# Patient Record
Sex: MALE | Race: WHITE
[De-identification: names, ages, dates, MRNs, and addresses within clinical notes are randomized per-mention and may not be internally consistent; named-entity substitution may affect disease eponyms.]

---

## 2018-05-29 ENCOUNTER — HOSPITAL ENCOUNTER (EMERGENCY)
Dept: HOSPITAL 56 - MW.ED | Age: 28
Discharge: HOME | End: 2018-05-29
Payer: SELF-PAY

## 2018-05-29 DIAGNOSIS — S03.00XA: Primary | ICD-10-CM

## 2018-05-29 DIAGNOSIS — Z88.0: ICD-10-CM
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
## 2018-05-29 LAB
CHLORIDE SERPL-SCNC: 104 MMOL/L (ref 98–107)
SODIUM SERPL-SCNC: 141 MMOL/L (ref 136–148)

## 2018-05-29 PROCEDURE — 80053 COMPREHEN METABOLIC PANEL: CPT

## 2018-05-29 PROCEDURE — 85025 COMPLETE CBC W/AUTO DIFF WBC: CPT

## 2018-05-29 PROCEDURE — 96361 HYDRATE IV INFUSION ADD-ON: CPT

## 2018-05-29 PROCEDURE — 36415 COLL VENOUS BLD VENIPUNCTURE: CPT

## 2018-05-29 PROCEDURE — 96374 THER/PROPH/DIAG INJ IV PUSH: CPT

## 2018-05-29 PROCEDURE — 99284 EMERGENCY DEPT VISIT MOD MDM: CPT

## 2018-05-29 PROCEDURE — 96375 TX/PRO/DX INJ NEW DRUG ADDON: CPT

## 2018-05-29 PROCEDURE — 21480 CLTX TMPRMAND DISLC 1ST/SBSQ: CPT

## 2018-05-29 PROCEDURE — 70100 X-RAY EXAM OF JAW <4VIEWS: CPT

## 2018-05-29 NOTE — EDM.PDOC
<Archana Delvalle - Last Filed: 05/29/18 19:46>





ED HPI GENERAL MEDICAL PROBLEM





- General


Chief Complaint: ENT Problem


Stated Complaint: LOCKED JAW


Time Seen by Provider: 05/29/18 18:07





- History of Present Illness


INITIAL COMMENTS - FREE TEXT/NARRATIVE: 





HISTORY AND PHYSICAL:





History of present illness:


The patient is a 27-year-old male who presents with complaints that his jaw is 

locked open and he cannot close it and this is not the first time this has 

occurred. He actually tells me that this has happened about 8 or 9 times. The 

patient says this is happened several times before and he was supposed to follow

-up at [facility] for definitive treatment and was not able to follow-up. In 

the past he has required conscious sedation to do this and it happens once 

every 6 months or so. He says it usually occurs when he vomits coughs laughs  

or yawns too hard. 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 

from work at an oil rig. He last ate sometime this morning around 6 AM. He has 

no chest pain shortness of breath or abdominal pain and had an episode of 

vomiting earlier today which triggered this. He has had no direct trauma to the 

area. Patient states that he only vomited one time and that was triggered by 

the heat and he otherwise feels fine currently. He has no nausea or abdominal 

complaints currently. He has no headache or dizziness.





Review of systems: 


As per history of present illness and below otherwise all systems reviewed and 

negative.





Past medical history: 


As per history of present illness and as reviewed below otherwise 

noncontributory.





Surgical history: 


As per history of present illness and as reviewed below otherwise 

noncontributory.





Social history: 


No reported history of drug or alcohol abuse.





Family history: 


As per history of present illness and as reviewed below otherwise 

noncontributory.





Physical exam:


General: Well-developed well-nourished man who is nontoxic and vital signs are 

noted by me. He visibly has his mouth open and cannot close it. There is no 

soft tissue swelling of the face


HEENT: Atraumatic, normocephalic, pupils reactive, negative for conjunctival 

pallor or scleral icterus, mucous membranes moist, throat clear, neck supple, 

nontender, trachea midline. No cervical adenopathy or nuchal rigidity there are 

no palpable tenderness or deformities of the mandible or the maxilla.


Lungs: Clear to auscultation, breath sounds equal bilaterally, chest nontender.


Heart: S1S2, regular rate and rhythm no overt murmurs


Abdomen: Soft, nondistended, nontender. NABS


Pelvis: Deferred


Genitourinary: Deferred.


Rectal: Deferred.


Extremities: Atraumatic, negative for cords or calf pain. Neurovascular 

unremarkable.


Neuro: Awake, alert, oriented. Cranial nerves II through XII unremarkable. 

Cerebellum unremarkable. Motor and sensory unremarkable throughout. Exam 

nonfocal.





Diagnostics:


CBC CMP x-ray of the mandible





Therapeutics:


IV, IV fluids, Dilaudid, Zofran, Toradol Versed





Procedure note: Dilaudid and Zofran and Toradol had been given earlier and 

Versed was administered in a delayed fashion and I offered the patient to use 

the syringe method to try to reduce the mandible. He told me that he could not 

bite down on the syringe and exhibited poor effort. I gently massaged the 

musculature of the jaw more on his left side due to x-ray findings and 

attempted to slowly distract the mandible downward and the patient would not 

relax and tried to clench. Despite clinically looking more relaxed still he is 

still resistant to my attempts. I made 2 attempts to using this method and Dr. Draper is also made an attempt and again the patient would not relax and says 

it is too painful. We have contacted anesthesia to come to the ER do conscious 

sedation.





1945: The patient is aware that anesthesia will come and do conscious sedation 

and I will endorse this case to Dr. Draper to perform the procedure and 

repeat the x-ray.





Impression: 


Mandibular dislocation recurrent





Definitive disposition and diagnosis as appropriate pending reevaluation and 

review of above.


  ** Oral/Mouth


Pain Score (Numeric/FACES): 8





- Related Data


 Allergies











Allergy/AdvReac Type Severity Reaction Status Date / Time


 


Penicillins Allergy  Hives Verified 05/29/18 18:53











Home Meds: 


 Home Meds





. [No Known Home Meds]  05/29/18 [History]











ED ROS GENERAL





- Review of Systems


Review Of Systems: ROS reveals no pertinent complaints other than HPI.





ED EXAM, GENERAL





- Physical Exam


Exam: See Below (See dictation)





Course





- Vital Signs


Last Recorded V/S: 


 Last Vital Signs











Temp  97.2 F   05/29/18 17:58


 


Pulse  77   05/29/18 17:58


 


Resp  20   05/29/18 17:58


 


BP  134/79   05/29/18 17:58


 


Pulse Ox  98   05/29/18 17:58














- Orders/Labs/Meds


Orders: 


 Active Orders 24 hr











 Category Date Time Status


 


 Cardiac Monitoring [RC] .AS DIRECTED Care  05/29/18 19:10 Active


 


 Oxygen Therapy, ED [RC] ASDIRECTED Care  05/29/18 19:10 Active


 


 Pulse Oximetry [RC] ASDIRECTED Care  05/29/18 19:10 Active


 


 Mandible Less 4V [CR] Stat Exams  05/29/18 18:12 Taken


 


 Mandible Less 4V [CR] Stat Exams  05/29/18 20:23 Taken


 


 Sodium Chloride 0.9% [Normal Saline] 1,000 ml Med  05/29/18 19:45 Active





 IV ASDIRECTED   


 


 Sodium Chloride 0.9% [Saline Flush] Med  05/29/18 18:11 Active





 10 ml FLUSH ASDIRECTED PRN   


 


 Sodium Chloride 0.9% [Saline Flush] Med  05/29/18 18:11 Active





 2.5 ml FLUSH ASDIRECTED PRN   


 


 Saline Lock Insert [OM.PC] Stat Oth  05/29/18 18:11 Ordered








 Medication Orders





Sodium Chloride (Normal Saline)  1,000 mls @ 150 mls/hr IV ASDIRECTED WALESKA


   Last Admin: 05/29/18 19:46  Dose: 150 mls/hr


Sodium Chloride (Saline Flush)  10 ml FLUSH ASDIRECTED PRN


   PRN Reason: Keep Vein Open


   Last Admin: 05/29/18 19:46  Dose: 10 ml


Sodium Chloride (Saline Flush)  2.5 ml FLUSH ASDIRECTED PRN


   PRN Reason: Keep Vein Open


   Last Admin: 05/29/18 19:46  Dose: 2.5 ml








Labs: 


 Laboratory Tests











  05/29/18 05/29/18 Range/Units





  18:15 18:15 


 


WBC  9.70   (4.0-11.0)  K/uL


 


RBC  5.23   (4.50-5.90)  M/uL


 


Hgb  15.6   (13.0-17.0)  g/dL


 


Hct  42.5   (38.0-50.0)  %


 


MCV  81.3   (80.0-98.0)  fL


 


MCH  29.8   (27.0-32.0)  pg


 


MCHC  36.7   (31.0-37.0)  g/dL


 


RDW Std Deviation  36.3   (28.0-62.0)  fl


 


RDW Coeff of Ifeanyi  12   (11.0-15.0)  %


 


Plt Count  282   (150-400)  K/uL


 


MPV  9.00   (7.40-12.00)  fL


 


Neut % (Auto)  53.0   (48.0-80.0)  %


 


Lymph % (Auto)  33.3   (16.0-40.0)  %


 


Mono % (Auto)  10.9   (0.0-15.0)  %


 


Eos % (Auto)  2.5   (0.0-7.0)  %


 


Baso % (Auto)  0.3   (0.0-1.5)  %


 


Neut # (Auto)  5.1   (1.4-5.7)  K/uL


 


Lymph # (Auto)  3.2 H   (0.6-2.4)  K/uL


 


Mono # (Auto)  1.1 H   (0.0-0.8)  K/uL


 


Eos # (Auto)  0.2   (0.0-0.7)  K/uL


 


Baso # (Auto)  0.0   (0.0-0.1)  K/uL


 


Nucleated RBC %  0.0   /100WBC


 


Nucleated RBCs #  0   K/uL


 


Sodium   141  (136-148)  mmol/L


 


Potassium   3.1 L  (3.5-5.1)  mmol/L


 


Chloride   104  ()  mmol/L


 


Carbon Dioxide   27.2  (21.0-32.0)  mmol/L


 


BUN   31 H  (7.0-18.0)  mg/dL


 


Creatinine   1.1  (0.8-1.3)  mg/dL


 


Est Cr Clr Drug Dosing   96.73  mL/min


 


Estimated GFR (MDRD)   > 60.0  ml/min


 


Glucose   85  ()  mg/dL


 


Calcium   9.5  (8.5-10.1)  mg/dL


 


Total Bilirubin   0.9  (0.2-1.0)  mg/dL


 


AST   28  (15-37)  IU/L


 


ALT   39  (14-63)  IU/L


 


Alkaline Phosphatase   53  ()  U/L


 


Total Protein   7.9  (6.4-8.2)  g/dL


 


Albumin   4.9  (3.4-5.0)  g/dL


 


Globulin   3.0  (2.0-3.5)  g/dL


 


Albumin/Globulin Ratio   1.6  (1.3-2.8)  











Meds: 


Medications











Generic Name Dose Route Start Last Admin





  Trade Name Esvinq  PRN Reason Stop Dose Admin


 


Sodium Chloride  1,000 mls @ 150 mls/hr  05/29/18 19:45  05/29/18 19:46





  Normal Saline  IV   150 mls/hr





  ASDIRECTED WALESKA   Administration





     





     





     





     


 


Sodium Chloride  10 ml  05/29/18 18:11  05/29/18 19:46





  Saline Flush  FLUSH   10 ml





  ASDIRECTED PRN   Administration





  Keep Vein Open   





     





     





     


 


Sodium Chloride  2.5 ml  05/29/18 18:11  05/29/18 19:46





  Saline Flush  FLUSH   2.5 ml





  ASDIRECTED PRN   Administration





  Keep Vein Open   





     





     





     














Discontinued Medications














Generic Name Dose Route Start Last Admin





  Trade Name John  PRN Reason Stop Dose Admin


 


Fentanyl  Confirm  05/29/18 20:09  





  Sublimaze  Administered  05/29/18 20:10  





  Dose   





  100 mcg   





  .ROUTE   





  .STK-MED ONE   





     





     





     





     


 


Hydromorphone HCl  1 mg  05/29/18 18:11  05/29/18 18:28





  Dilaudid  IVPUSH  05/29/18 18:12  1 mg





  ONETIME ONE   Administration





     





     





     





     


 


Sodium Chloride  1,000 mls @ 999 mls/hr  05/29/18 18:11  05/29/18 18:29





  Normal Saline  IV  05/29/18 19:11  999 mls/hr





  STAT ONE   Administration





     





     





     





     


 


Ketorolac Tromethamine  30 mg  05/29/18 18:11  05/29/18 18:27





  Toradol  IVPUSH  05/29/18 18:12  30 mg





  ONETIME ONE   Administration





     





     





     





     


 


Midazolam HCl  2 mg  05/29/18 19:07  05/29/18 19:14





  Versed 1 Mg/Ml  IVPUSH  05/29/18 19:08  2 mg





  ONETIME ONE   Administration





     





     





     





     


 


Morphine Sulfate  2 mg  05/29/18 19:52  05/29/18 20:03





  Morphine  IVPUSH  05/29/18 19:53  Not Given





  ONETIME ONE   





     





     





     





     


 


Ondansetron HCl  4 mg  05/29/18 18:17  05/29/18 18:27





  Zofran  IVPUSH  05/29/18 18:18  4 mg





  ONETIME ONE   Administration





     





     





     





     


 


Propofol  Confirm  05/29/18 20:07  





  Diprivan  20 Ml  Administered  05/29/18 20:08  





  Dose   





  200 mg   





  .ROUTE   





  .STK-MED ONE   





     





     





     





     














Departure





- Departure


Disposition: Home, Self-Care 01


Condition: Good


Clinical Impression: 


Dislocation of mandible


Qualifiers:


 Encounter type: initial encounter Qualified Code(s): S03.00XA - Dislocation of 

jaw, unspecified side, initial encounter








- Discharge Information


Referrals: 


PCP,None [Primary Care Provider] - 


Forms:  ED Department Discharge


Additional Instructions: 


The following information is given to patients seen in the emergency department 

who are being discharged to home. This information is to outline your options 

for follow-up care. We provide all patients seen in our emergency department 

with a follow-up referral.





The need for follow-up, as well as the timing and circumstances, are variable 

depending upon the specifics of your emergency department visit.





If you don't have a primary care physician on staff, we will provide you with a 

referral. We always advise you to contact your personal physician following an 

emergency department visit to inform them of the circumstance of the visit and 

for follow-up with them and/or the need for any referrals to a consulting 

specialist.





The emergency department will also refer you to a specialist when appropriate. 

This referral assures that you have the opportunity for followup care with a 

specialist. All of these measure are taken in an effort to provide you with 

optimal care, which includes your followup.





Under all circumstances we always encourage you to contact your private 

physician who remains a resource for coordinating  your care. When calling for 

followup care, please make the office aware that this follow-up is from your 

recent emergency room visit. If for any reason you are refused follow-up, 

please contact the [facility] emergency 

department at [phone] and ask to speak to the emergency department 

charge nurse.





[facility] 


Primary care- [place] [address]


[phone]








Only eat soft foods with small bites and push hydration over the next 48-72 

hours. Apply ice to face for any swelling. Please contact your provider or one 

of our providers for follow-up care and referral for definitive treatment. You 

may also contact the oral maxillofacial surgeon in [facility] in [place] or 

in [place] at [facility] to get further evaluation and care. Return to ER as 

needed and as discussed.





- My Orders


Last 24 Hours: 


My Active Orders





05/29/18 20:23


Mandible Less 4V [CR] Stat 














- Assessment/Plan


Last 24 Hours: 


My Active Orders





05/29/18 20:23


Mandible Less 4V [CR] Stat 














<Rohan Draper - Last Filed: 05/29/18 21:27>





ED HPI GENERAL MEDICAL PROBLEM





- History of Present Illness


INITIAL COMMENTS - FREE TEXT/NARRATIVE: 


This is Dr. Draper taking over care for patient at 07 30 from Dr. Delvalle. 

Dr. Delvalle briefed me on patient's condition as well as pertinent labs and 

history. Dr. Cadet, anesthesiologist, was able to sedate the patient with 

propofol and fentanyl and I was able to successfully reduce mandible 

dislocation. Using continuous downward and posterior pressure. There were no 

complications to procedure. Patient tolerated procedure well. Repeat x-rays of 

mandible confirmed relocation. Patient was discharged in good condition with 

instructions to see his Maxofascial surgeon to correct his mandible so no 

future episodes occur. Patient was also instructed to follow-up with his 

primary care physician and to return to emergency department if he had any new 

or worsening symptoms.








ED ROS GENERAL





- Review of Systems


Review Of Systems: See Below





ED EXAM, GENERAL





- Physical Exam


Exam: See Below





Departure





- Departure


Time of Disposition: 21:27

## 2018-05-29 NOTE — PCM.SN
- Free Text/Narrative


Note: 





Patient seen at the request of Dr Delvalle. She had attempted to reduce a left 

mandibular dislocation earlier. Due to pain demonstrated by patient the 

sedation meds were inadequate. I reviewed his history which included multiple 

prior dislocations in past with subsequesnt recommendations to seek surgical 

intervention. He has not done this. He states that he has not had oral intake 

today. He is allergic to PCN. 





With patient in the sitting position, I used total dose of 100 mg propofol, and 

[address] Draper immediately reduced the dislocation and i 

attended the patient in the next 15 minutes and he became animated and was 

released to x-ray for confirmation. Time of bedside attendance was 1955 to 

2035. He was released back to the [facility] care.

## 2018-05-30 NOTE — CR
EXAM DATE: 18



PATIENT'S AGE: 27





Patient: VIOLETA MORRISON



Facility: [place], [place]

Patient ID: [id number]

Site Patient ID: [id number].

Site Accession #: [id number]

: 1990

Study: XRay Head mandible [id number]/29/2018 8:58:03 PM

Ordering Physician: Baron Madrigal



Final Report: 

HISTORY:

Mandible relocation.



FINDINGS:

Two oblique and AP image of the mandible were obtained and compared to exam 
from 1834 hours. There is normal alignment of both temporomandibular joints. No 
mandibular fracture is identified.



IMPRESSION:

Normal alignment of both TMJs.



Dictated by Abida Vergara MD @ 2018 9:25:53 PM







Dictated by: Abida Vergara MD @ 2018 21:26:03

(Electronic Signature)



Report Signed by Proxy.
[facility]

## 2018-05-30 NOTE — CR
EXAM DATE: 18



PATIENT'S AGE: 27





Patient: VIOLETA MORRISON



Facility: [place], [place]

Patient ID: [id number]

Site Patient ID: [id number].

Site Accession #: [id number]

: 1990

Study: XRay Head Bilateral mandible [id number]/29/2018 6:49:07 PM

Ordering Physician: Adelia Magaña



Final Report: 

HISTORY:

Bilateral mandibular dislocation. History of dislocation.



FINDINGS:

AP and oblique views of the TMJs were obtained. The left mandibular condyle 
appears anterior to the temporal fossa. The right mandibular condyle is also 
mildly displaced anteriorly but not as significantly at the left. The mandible 
is intact.



IMPRESSION:

Plain films suggest anterior dislocation of the left mandibular condyle 
relative to the fossa with milder anterior subluxation on the right. Recommend 
CT for complete evaluation.



Dictated by Abida Vergara MD @ 2018 7:20:37 PM







Dictated by: Abida Vergara MD @ 2018 19:20:46

(Electronic Signature)



Report Signed by Proxy.
JAMILA

## 2018-06-26 ENCOUNTER — HOSPITAL ENCOUNTER (EMERGENCY)
Dept: HOSPITAL 56 - MW.ED | Age: 28
Discharge: HOME | End: 2018-06-26
Payer: SELF-PAY

## 2018-06-26 DIAGNOSIS — S03.03XA: Primary | ICD-10-CM
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
## 2018-06-26 DIAGNOSIS — F17.210: ICD-10-CM

## 2018-06-26 PROCEDURE — 99153 MOD SED SAME PHYS/QHP EA: CPT

## 2018-06-26 PROCEDURE — 96360 HYDRATION IV INFUSION INIT: CPT

## 2018-06-26 PROCEDURE — 70110 X-RAY EXAM OF JAW 4/> VIEWS: CPT

## 2018-06-26 PROCEDURE — 99283 EMERGENCY DEPT VISIT LOW MDM: CPT

## 2018-06-26 PROCEDURE — 99152 MOD SED SAME PHYS/QHP 5/>YRS: CPT

## 2018-06-26 PROCEDURE — 21480 CLTX TMPRMAND DISLC 1ST/SBSQ: CPT

## 2018-06-26 NOTE — EDM.PDOC
ED HPI GENERAL MEDICAL PROBLEM





- General


Chief Complaint: ENT Problem


Stated Complaint: LOCK JAW


Time Seen by Provider: 06/26/18 13:11


Source of Information: Reports: Patient


History Limitations: Reports: No Limitations





- History of Present Illness


INITIAL COMMENTS - FREE TEXT/NARRATIVE: 


History of present illness:


[]Patient was transferred from [facility] for conscious sedation for 

reduction of jaw dislocation that have been this morning. 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 

episode of his jaw dislocating. Not been able to follow-up with maxillofacial 

surgeon due to lack of medical insurance. Patient last ate at 4 AM denies any 

other injuries





Review of systems: 


As per history of present illness and below otherwise all systems reviewed and 

negative.





Past medical history: 


As per history of present illness and as reviewed below otherwise 

noncontributory.





Surgical history: 


As per history of present illness and as reviewed below otherwise 

noncontributory.





Social history: 


No reported history of drug or alcohol abuse.





Family history: 


As per history of present illness and as reviewed below otherwise 

noncontributory.





Physical exam:


General: Well developed, well nourished in NAD


HEENT: Atraumatic, mandible opened and locked appear symmetrical, pupils 

reactive, negative for conjunctival pallor or scleral icterus, mucous membranes 

moist, throat clear, neck supple, nontender, trachea midline.


Lungs: Clear to auscultation, breath sounds equal bilaterally, chest nontender.


Heart: S1S2, regular, negative for clicks, rubs, or JVD.


Abdomen: Soft, nondistended, nontender. Negative for masses or 

hepatosplenomegaly. Negative for costovertebral tenderness.


Pelvis: Stable nontender.


Genitourinary: Deferred.


Rectal: Deferred.


Extremities: Atraumatic, negative for cords or calf pain. Neurovascular 

unremarkable.


Neuro: Awake, alert, oriented. Cranial nerves II through XII unremarkable. 

Cerebellum unremarkable. Motor and sensory unremarkable throughout. Exam 

nonfocal.





Diagnostics:


[]Mandibular x-ray shows anterior dislocation of both TMJs





Therapeutics:


[]Norflex and Toradol which was given prior to arrival, conscious sedation done 

by anesthesia reduction was done with pressure placed on the coronoid process 

on the right with anterior pressure placed on the left angle of the mandible 

resulting in reduction of the right TMJ followed by posterior pressure on the 

left coronoid process with easy reduction of the right TMJ





Impression: 


[]Bilateral anterior temporomandibular dislocation, reduction with conscious 

sedation





Plan:


[]Follow-up with primary care.





Definitive disposition and diagnosis as appropriate pending reevaluation and 

review of above.





  ** jaw


Pain Score (Numeric/FACES): 8





- Related Data


 Allergies











Allergy/AdvReac Type Severity Reaction Status Date / Time


 


Penicillins Allergy  Hives Verified 06/26/18 13:09











Home Meds: 


 Home Meds





. [No Known Home Meds]  05/29/18 [History]











Past Medical History





- Past Health History


Medical/Surgical History: Denies Medical/Surgical History


HEENT History: Reports: Other (See Below)


Other HEENT History: Lock Jaw


Cardiovascular History: Reports: None


Respiratory History: Reports: None


Gastrointestinal History: Reports: Other (See Below)


Other Gastrointestinal History: Hx hernia


Genitourinary History: Reports: None


Musculoskeletal History: Reports: Other (See Below)


Other Musculoskeletal History: recurrent lock jaw


Neurological History: Reports: None


Psychiatric History: Reports: None


Endocrine/Metabolic History: Reports: None


Hematologic History: Reports: None


Immunologic History: Reports: None


Oncologic (Cancer) History: Reports: None


Dermatologic History: Reports: None





- Infectious Disease History


Infectious Disease History: Reports: Chicken Pox





- Past Surgical History


Head Surgeries/Procedures: Reports: None


HEENT Surgical History: Reports: None


Cardiovascular Surgical History: Reports: None


Respiratory Surgical History: Reports: None


GI Surgical History: Reports: None


Male  Surgical History: Reports: None


Endocrine Surgical History: Reports: None


Neurological Surgical History: Reports: None


Musculoskeletal Surgical History: Reports: None


Oncologic Surgical History: Reports: None


Dermatological Surgical History: Reports: None





Social & Family History





- Family History


Family Medical History: Noncontributory





- Tobacco Use


Smoking Status *Q: Current Every Day Smoker


Years of Tobacco use: 10


Packs/Tins Daily: 1





- Caffeine Use


Caffeine Use: Reports: None





- Recreational Drug Use


Recreational Drug Use: No





ED ROS ENT





- Review of Systems


Review Of Systems: See Below (The history of present illness)





ED EXAM, ENT





- Physical Exam


Exam: See Below (See history of present illness)





Course





- Vital Signs


Last Recorded V/S: 


 Last Vital Signs











Temp  96.8 F   06/26/18 13:10


 


Pulse  73   06/26/18 13:10


 


Resp  18   06/26/18 13:10


 


BP  130/84   06/26/18 13:10


 


Pulse Ox  98   06/26/18 13:10














- Orders/Labs/Meds


Orders: 


 Active Orders 24 hr











 Category Date Time Status


 


 Mandible Comp Min 4V [CR] Stat Exams  06/26/18 15:01 Ordered


 


 Orphenadrine [Norflex] Med  06/26/18 14:00 Active





 60 mg IM Q12H   


 


 Sodium Chloride 0.9% [Saline Flush] Med  06/26/18 13:17 Active





 10 ml FLUSH ASDIRECTED PRN   


 


 Sodium Chloride 0.9% [Saline Flush] Med  06/26/18 14:25 Active





 10 ml FLUSH ASDIRECTED PRN   


 


 Sodium Chloride 0.9% [Saline Flush] Med  06/26/18 13:17 Active





 2.5 ml FLUSH ASDIRECTED PRN   


 


 Sodium Chloride 0.9% [Saline Flush] Med  06/26/18 14:25 Active





 2.5 ml FLUSH ASDIRECTED PRN   


 


 Saline Lock Insert [OM.PC] Stat Oth  06/26/18 13:17 Ordered


 


 Saline Lock Insert [OM.PC] Stat Oth  06/26/18 14:25 Ordered








 Medication Orders





Orphenadrine Citrate (Norflex)  60 mg IM Q12H WALESKA


   Last Admin: 06/26/18 14:04  Dose: 60 mg


Sodium Chloride (Saline Flush)  10 ml FLUSH ASDIRECTED PRN


   PRN Reason: Keep Vein Open


Sodium Chloride (Saline Flush)  2.5 ml FLUSH ASDIRECTED PRN


   PRN Reason: Keep Vein Open


Sodium Chloride (Saline Flush)  10 ml FLUSH ASDIRECTED PRN


   PRN Reason: Keep Vein Open


Sodium Chloride (Saline Flush)  2.5 ml FLUSH ASDIRECTED PRN


   PRN Reason: Keep Vein Open








Meds: 


Medications











Generic Name Dose Route Start Last Admin





  Trade Name Freq  PRN Reason Stop Dose Admin


 


Orphenadrine Citrate  60 mg  06/26/18 14:00  06/26/18 14:04





  Norflex  IM   60 mg





  Q12H WALESKA   Administration





     





     





     





     


 


Sodium Chloride  10 ml  06/26/18 13:17  





  Saline Flush  FLUSH   





  ASDIRECTED PRN   





  Keep Vein Open   





     





     





     


 


Sodium Chloride  2.5 ml  06/26/18 13:17  





  Saline Flush  FLUSH   





  ASDIRECTED PRN   





  Keep Vein Open   





     





     





     


 


Sodium Chloride  10 ml  06/26/18 14:25  





  Saline Flush  FLUSH   





  ASDIRECTED PRN   





  Keep Vein Open   





     





     





     


 


Sodium Chloride  2.5 ml  06/26/18 14:25  





  Saline Flush  FLUSH   





  ASDIRECTED PRN   





  Keep Vein Open   





     





     





     














Discontinued Medications














Generic Name Dose Route Start Last Admin





  Trade Name John  PRN Reason Stop Dose Admin


 


Fentanyl  Confirm  06/26/18 15:01  





  Sublimaze  Administered  06/26/18 15:02  





  Dose   





  100 mcg   





  .ROUTE   





  .STK-MED ONE   





     





     





     





     


 


Midazolam HCl  Confirm  06/26/18 15:01  





  Versed 1 Mg/Ml  Administered  06/26/18 15:02  





  Dose   





  2 mg   





  .ROUTE   





  .STK-MED ONE   





     





     





     





     


 


Propofol  Confirm  06/26/18 15:01  





  Diprivan  20 Ml  Administered  06/26/18 15:02  





  Dose   





  200 mg   





  .ROUTE   





  .STK-MED ONE   





     





     





     





     


 


Propofol  Confirm  06/26/18 15:09  





  Diprivan  20 Ml  Administered  06/26/18 15:10  





  Dose   





  200 mg   





  .ROUTE   





  .STK-MED ONE   





     





     





     





     














Departure





- Departure


Time of Disposition: 15:40


Disposition: Home, Self-Care 01


Condition: Good


Clinical Impression: 


Dislocation of both temporomandibular joints


Qualifiers:


 Encounter type: initial encounter Qualified Code(s): S03.03XA - Dislocation of 

jaw, bilateral, initial encounter








- Discharge Information


Referrals: 


PCP,None [Primary Care Provider] - 


Forms:  ED Department Discharge


Additional Instructions: 


The following information is given to patients seen in the emergency department 

who are being discharged to home. This information is to outline your options 

for follow-up care. We provide all patients seen in our emergency department 

with a follow-up referral.





The need for follow-up, as well as the timing and circumstances, are variable 

depending upon the specifics of your emergency department visit.





If you don't have a primary care physician on staff, we will provide you with a 

referral. We always advise you to contact your personal physician following an 

emergency department visit to inform them of the circumstance of the visit and 

for follow-up with them and/or the need for any referrals to a consulting 

specialist.





The emergency department will also refer you to a specialist when appropriate. 

This referral assures that you have the opportunity for follow-up care with a 

specialist. All of these measure are taken in an effort to provide you with 

optimal care, which includes your follow-up.





Under all circumstances we always encourage you to contact your private 

physician who remains a resource for coordinating your care. When calling for 

follow-up care, please make the office aware that this follow-up is from your 

recent emergency room visit. If for any reason you are refused follow-up, 

please contact the [facility] Emergency 

Department at [phone] and asked to speak to the emergency department 

charge nurse.





[facility]


Primary Care


[address] [place] [id number]


Phone: [phone]


Fax: [phone]





- My Orders


Last 24 Hours: 


My Active Orders





06/26/18 13:17


Sodium Chloride 0.9% [Saline Flush]   10 ml FLUSH ASDIRECTED PRN 


Sodium Chloride 0.9% [Saline Flush]   2.5 ml FLUSH ASDIRECTED PRN 


Saline Lock Insert [OM.PC] Stat 





06/26/18 14:00


Orphenadrine [Norflex]   60 mg IM Q12H 





06/26/18 14:25


Sodium Chloride 0.9% [Saline Flush]   10 ml FLUSH ASDIRECTED PRN 


Sodium Chloride 0.9% [Saline Flush]   2.5 ml FLUSH ASDIRECTED PRN 


Saline Lock Insert [OM.PC] Stat 





06/26/18 15:01


Mandible Comp Min 4V [CR] Stat 














- Assessment/Plan


Last 24 Hours: 


My Active Orders





06/26/18 13:17


Sodium Chloride 0.9% [Saline Flush]   10 ml FLUSH ASDIRECTED PRN 


Sodium Chloride 0.9% [Saline Flush]   2.5 ml FLUSH ASDIRECTED PRN 


Saline Lock Insert [OM.PC] Stat 





06/26/18 14:00


Orphenadrine [Norflex]   60 mg IM Q12H 





06/26/18 14:25


Sodium Chloride 0.9% [Saline Flush]   10 ml FLUSH ASDIRECTED PRN 


Sodium Chloride 0.9% [Saline Flush]   2.5 ml FLUSH ASDIRECTED PRN 


Saline Lock Insert [OM.PC] Stat 





06/26/18 15:01


Mandible Comp Min 4V [CR] Stat

## 2018-06-26 NOTE — CR
EXAMINATION: Mandible

 

HISTORY: Dislocation

 

COMPARISON: 5/29/2018

 

TECHNIQUE: PA, Syed, and bilateral oblique views.

 

FINDINGS/IMPRESSION: The mandible appears intact and bone mineralization is normal. Both mandibular c
ondyles appear anteriorly located relative to the articular tubercle suggesting dislocation bilateral
ly.

## 2018-06-26 NOTE — PCM.PREANE
Preanesthetic Assessment





- Procedure


Proposed Procedure: 





sedation for jaw dislocation





- Anesthesia/Transfusion/Family Hx


Anesthesia History: Prior Anesthesia Without Reaction


Family History of Anesthesia Reaction: No





- Review of Systems


Other: Reports: None





- Physical Assessment


NPO Status Date: 06/26/18


NPO Status Time: 04:00


O2 Sat by Pulse Oximetry: 98


Respiratory Rate: 18


Vital Signs: 





 Last Vital Signs











Temp  36.0 C   06/26/18 13:10


 


Pulse  73   06/26/18 13:10


 


Resp  18   06/26/18 13:10


 


BP  130/84   06/26/18 13:10


 


Pulse Ox  98   06/26/18 13:10











Height: 5 ft 8 in


Weight: 83.915 kg


ASA Class: 2E


Mental Status: Alert & Oriented x3


Airway Class: Mallampati = 2


Dentition: Reports: Normal Dentition


Mouth Opening Finger Breadths: 1 (jaw dislocated)


ROM/Head Extension: Full





- Allergies


Allergies/Adverse Reactions: 


 Allergies











Allergy/AdvReac Type Severity Reaction Status Date / Time


 


Penicillins Allergy  Hives Verified 06/26/18 13:09














- Blood


Blood Available: No





- Acknowledgements


Anesthesia Type Planned: MAC


Pt an Appropriate Candidate for the Planned Anesthesia: Yes


Alternatives and Risks of Anesthesia Discussed w Pt/Guardian: Yes


Pt/Guardian Understands and Agrees with Anesthesia Plan: Yes





PreAnesthesia Questionnaire





- Past Health History


Medical/Surgical History: Denies Medical/Surgical History


HEENT History: Reports: Other (See Below)


Other HEENT History: Lock Jaw


Cardiovascular History: Reports: None


Respiratory History: Reports: None


Gastrointestinal History: Reports: Other (See Below)


Other Gastrointestinal History: Hx hernia


Genitourinary History: Reports: None


Musculoskeletal History: Reports: Other (See Below)


Other Musculoskeletal History: recurrent lock jaw


Neurological History: Reports: None


Psychiatric History: Reports: None


Endocrine/Metabolic History: Reports: None


Hematologic History: Reports: None


Immunologic History: Reports: None


Oncologic (Cancer) History: Reports: None


Dermatologic History: Reports: None





- Infectious Disease History


Infectious Disease History: Reports: Chicken Pox





- Past Surgical History


Head Surgeries/Procedures: Reports: None


HEENT Surgical History: Reports: None


Cardiovascular Surgical History: Reports: None


Respiratory Surgical History: Reports: None


GI Surgical History: Reports: None


Male  Surgical History: Reports: None


Endocrine Surgical History: Reports: None


Neurological Surgical History: Reports: None


Musculoskeletal Surgical History: Reports: None


Oncologic Surgical History: Reports: None


Dermatological Surgical History: Reports: None





- SUBSTANCE USE


Smoking Status *Q: Current Every Day Smoker


Recreational Drug Use History: No





- HOME MEDS


Home Medications: 


 Home Meds





. [No Known Home Meds]  05/29/18 [History]











- CURRENT (IN HOUSE) MEDS


Current Meds: 





 Current Medications





Orphenadrine Citrate (Norflex)  60 mg IM Q12H WALESKA


   Last Admin: 06/26/18 14:04 Dose:  60 mg


Sodium Chloride (Saline Flush)  10 ml FLUSH ASDIRECTED PRN


   PRN Reason: Keep Vein Open


Sodium Chloride (Saline Flush)  2.5 ml FLUSH ASDIRECTED PRN


   PRN Reason: Keep Vein Open


Sodium Chloride (Saline Flush)  10 ml FLUSH ASDIRECTED PRN


   PRN Reason: Keep Vein Open


Sodium Chloride (Saline Flush)  2.5 ml FLUSH ASDIRECTED PRN


   PRN Reason: Keep Vein Open





Discontinued Medications





Fentanyl (Sublimaze) Confirm Administered Dose 100 mcg .ROUTE .STK-MED ONE


   Stop: 06/26/18 15:02


Midazolam HCl (Versed 1 Mg/Ml) Confirm Administered Dose 2 mg .ROUTE .STK-MED 

ONE


   Stop: 06/26/18 15:02


Propofol (Diprivan  20 Ml) Confirm Administered Dose 200 mg .ROUTE .STK-MED ONE


   Stop: 06/26/18 15:02


Propofol (Diprivan  20 Ml) Confirm Administered Dose 200 mg .ROUTE .STK-MED ONE


   Stop: 06/26/18 15:10

## 2018-06-26 NOTE — CR
EXAMINATION: Mandibles

 

HISTORY: Reduction

 

COMPARISON: Same day

 

TECHNIQUE: 4 views

 

FINDINGS/IMPRESSION: The mandibular condyles are located posterior to the articular tubercle suggesti
ng a successful reduction. Otherwise osseous structures appear intact.

## 2018-08-10 ENCOUNTER — HOSPITAL ENCOUNTER (EMERGENCY)
Dept: HOSPITAL 56 - MW.ED | Age: 28
Discharge: HOME | End: 2018-08-10
Payer: COMMERCIAL

## 2018-08-10 DIAGNOSIS — Z88.4: ICD-10-CM
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
## 2018-08-10 DIAGNOSIS — Z88.5: ICD-10-CM

## 2018-08-10 DIAGNOSIS — S03.00XA: Primary | ICD-10-CM

## 2018-08-10 PROCEDURE — 21480 CLTX TMPRMAND DISLC 1ST/SBSQ: CPT

## 2018-08-10 PROCEDURE — 96374 THER/PROPH/DIAG INJ IV PUSH: CPT

## 2018-08-10 PROCEDURE — 70110 X-RAY EXAM OF JAW 4/> VIEWS: CPT

## 2018-08-10 PROCEDURE — 99284 EMERGENCY DEPT VISIT MOD MDM: CPT

## 2018-08-10 PROCEDURE — 99153 MOD SED SAME PHYS/QHP EA: CPT

## 2018-08-10 NOTE — PCM.PREANE
Preanesthetic Assessment





- Anesthesia/Transfusion/Family Hx


Anesthesia History: Prior Anesthesia Without Reaction


Family History of Anesthesia Reaction: No


Transfusion History: No Prior Transfusion(s)


Intubation History: Unknown





- Review of Systems


General: No Symptoms


Pulmonary: No Symptoms


Cardiovascular: No Symptoms


Gastrointestinal: No Symptoms


Neurological: No Symptoms


Other: Reports: None





- Physical Assessment


NPO Status Date: 08/10/18


NPO Status Time: 08:15


O2 Sat by Pulse Oximetry: 97


Respiratory Rate: 18


Vital Signs: 





 Last Vital Signs











Temp  98.3 F   08/10/18 19:28


 


Pulse  92   08/10/18 19:28


 


Resp  18   08/10/18 19:28


 


BP  127/84   08/10/18 19:28


 


Pulse Ox  97   08/10/18 19:28











Height: 5 ft 8 in


Weight: 86.183 kg


ASA Class: 2E


Mental Status: Alert & Oriented x3


Dentition: Reports: Normal Dentition


Thyro-Mental Finger Breadths: 3 (Limited exam d/t jaw dislocation)


ROM/Head Extension: Full


Lungs: Clear to Auscultation, Normal Respiratory Effort


Cardiovascular: Irregular Rhythm (SA), Bradycardia





- Allergies


Allergies/Adverse Reactions: 


 Allergies











Allergy/AdvReac Type Severity Reaction Status Date / Time


 


ketamine Allergy  Confusion Verified 08/10/18 19:31


 


Penicillins Allergy  Hives Verified 08/10/18 19:31














- Acknowledgements


Anesthesia Type Planned: MAC


Pt an Appropriate Candidate for the Planned Anesthesia: Yes


Alternatives and Risks of Anesthesia Discussed w Pt/Guardian: Yes


Pt/Guardian Understands and Agrees with Anesthesia Plan: Yes





PreAnesthesia Questionnaire





- Past Health History


Medical/Surgical History: Denies Medical/Surgical History


HEENT History: Reports: Other (See Below)


Other HEENT History: Lock Jaw


Cardiovascular History: Reports: None


Respiratory History: Reports: None


Gastrointestinal History: Reports: Other (See Below)


Other Gastrointestinal History: Hx hernia


Genitourinary History: Reports: None


Musculoskeletal History: Reports: Other (See Below)


Other Musculoskeletal History: recurrent lock jaw


Neurological History: Reports: None


Psychiatric History: Reports: None


Endocrine/Metabolic History: Reports: None


Hematologic History: Reports: None


Immunologic History: Reports: None


Oncologic (Cancer) History: Reports: None


Dermatologic History: Reports: None





- Infectious Disease History


Infectious Disease History: Reports: Chicken Pox





- Past Surgical History


Head Surgeries/Procedures: Reports: None


HEENT Surgical History: Reports: None


Cardiovascular Surgical History: Reports: None


Respiratory Surgical History: Reports: None


GI Surgical History: Reports: None


Male  Surgical History: Reports: None


Endocrine Surgical History: Reports: None


Neurological Surgical History: Reports: None


Musculoskeletal Surgical History: Reports: None


Oncologic Surgical History: Reports: None


Dermatological Surgical History: Reports: None





- SUBSTANCE USE


Smoking Status *Q: Current Every Day Smoker


Tobacco Use Within Last Twelve Months: Other (See Below)


Recreational Drug Use History: No





- HOME MEDS


Home Medications: 


 Home Meds





. [No Known Home Meds]  05/29/18 [History]











- CURRENT (IN HOUSE) MEDS


Current Meds: 





 Current Medications





Propofol (Diprivan 100 Ml)  100 mls @ 2.585 mls/hr IV TITRATE WALESKA; Protocol





Discontinued Medications





Bacitracin (Bacitracin Oint 1 Gm) Confirm Administered Dose 1 dose .ROUTE .STK-

MED ONE


   Stop: 08/10/18 21:04


   Last Admin: 08/10/18 21:12 Dose:  Not Given


Fentanyl (Sublimaze)  50 mcg IVPUSH ONETIME ONE


   Stop: 08/10/18 21:06


   Last Admin: 08/10/18 21:12 Dose:  Not Given


Fentanyl (Sublimaze) Confirm Administered Dose 100 mcg .ROUTE .STK-MED ONE


   Stop: 08/10/18 21:09


   Last Admin: 08/10/18 21:51 Dose:  Not Given


Fentanyl (Sublimaze)  100 mcg IVPUSH ONETIME ONE


   Stop: 08/10/18 21:13


   Last Admin: 08/10/18 21:51 Dose:  Not Given


Fentanyl (Sublimaze)  50 mcg IVPUSH ONETIME ONE


   Stop: 08/10/18 21:40


   Last Admin: 08/10/18 21:50 Dose:  Not Given


Fentanyl (Sublimaze) Confirm Administered Dose 100 mcg .ROUTE .STK-MED ONE


   Stop: 08/10/18 21:45


   Last Admin: 08/10/18 21:50 Dose:  Not Given


Lidocaine HCl (Xylocaine-Mpf 1%) Confirm Administered Dose 5 mls @ as directed 

.ROUTE .STK-MED ONE


   Stop: 08/10/18 21:10


   Last Admin: 08/10/18 21:50 Dose:  Not Given


Ketorolac Tromethamine (Toradol)  30 mg IVPUSH ONETIME ONE


   Stop: 08/10/18 19:52


   Last Admin: 08/10/18 20:03 Dose:  30 mg


Lidocaine HCl (Xylocaine 1%)  20 ml INJECT ONETIME ONE


   Stop: 08/10/18 21:04


   Last Admin: 08/10/18 21:11 Dose:  Not Given


Lidocaine HCl (Xylocaine-Mpf 1%)  5 ml INJECT ONETIME ONE


   Stop: 08/10/18 21:13


   Last Admin: 08/10/18 21:50 Dose:  Not Given


Midazolam HCl (Versed 5 Mg/Ml)  5 mg IVPUSH ONETIME ONE


   Stop: 08/10/18 21:04


   Last Admin: 08/10/18 21:11 Dose:  Not Given


Midazolam HCl (Versed 1 Mg/Ml) Confirm Administered Dose 2 mg .ROUTE .STK-MED 

ONE


   Stop: 08/10/18 21:09


   Last Admin: 08/10/18 21:50 Dose:  Not Given


Midazolam HCl (Versed 1 Mg/Ml)  2 mg IVPUSH ONETIME ONE


   Stop: 08/10/18 21:13


   Last Admin: 08/10/18 21:50 Dose:  Not Given


Propofol (Diprivan  20 Ml) Confirm Administered Dose 400 mg .ROUTE .STK-MED ONE


   Stop: 08/10/18 21:10


   Last Admin: 08/10/18 21:15 Dose:  Not Given


Propofol (Diprivan  20 Ml)  400 mg IVPUSH ONETIME ONE


   Stop: 08/10/18 21:13


   Last Admin: 08/10/18 21:51 Dose:  Not Given


Propofol (Diprivan  20 Ml)  20 mg IVPUSH ONETIME ONE


   Stop: 08/10/18 21:40


   Last Admin: 08/10/18 21:51 Dose:  Not Given

## 2018-08-10 NOTE — [id number]
Post Anesthesia Note





- EVALUATION WITHIN 48HRS OF ANESTHETIC


Vital Signs in Normal Range: Yes


Patient Participated in Evaluation: Yes


Respiratory Function Stable: Yes


Airway Patent: Yes


Cardiovascular Function Stable: Yes


Hydration Status Stable: Yes


Pain Control Satisfactory: Yes


Nausea and Vomiting Control Satisfactory: Yes


Mental Status Recovered: Yes


Pulse Rate: 62


Resp Rate: 18


Blood Pressure: 108/62





- COMMENTS/OBSERVATIONS


Free Text/Narrative:: 





no anesthesia problems

## 2018-08-10 NOTE — PCM.POSTAN
POST ANESTHESIA ASSESSMENT





- MENTAL STATUS


Mental Status: Alert, Oriented





- VITAL SIGNS


Pulse Rate: 62


SaO2: 98 (2 LPM)


Resp Rate: 18


Blood Pressure: 108/62





- RESPIRATORY


Respiratory Status: Respiratory Rate WNL, Airway Patent, O2 Saturation Stable





- CARDIOVASCULAR


CV Status: Pulse Rate WNL, Blood Pressure Stable





- GASTROINTESTINAL


GI Status: No Symptoms





- PAIN


Pain Score: 0





- POST OP HYDRATION


Hydration Status: Adequate & Stable

## 2018-08-10 NOTE — EDM.PDOC
ED HPI GENERAL MEDICAL PROBLEM





- General


Chief Complaint: General


Stated Complaint: LOCK JAW


Time Seen by Provider: 08/10/18 19:29


Source of Information: Reports: Patient


History Limitations: Reports: No Limitations





- History of Present Illness


INITIAL COMMENTS - FREE TEXT/NARRATIVE: 


HISTORY AND PHYSICAL:





History of present illness:


Patient is a 27-year-old male who presents to the emergency room today with 

complaints of jaw dislocation after yawning. He states he has had dislocation 

of his jaw about every 6 months. He has not followed up with a orthodontist as 

he states he does not have medical insurance at this time and cannot afford it.





Review of systems: 


As per history of present illness and below otherwise all systems reviewed and 

negative.





Past medical history: 


As per history of present illness and as reviewed below otherwise 

noncontributory.





Surgical history: 


As per history of present illness and as reviewed below otherwise 

noncontributory.





Social history: 


No reported history of drug or alcohol abuse.





Family history: 


As per history of present illness and as reviewed below otherwise 

noncontributory.





Physical exam:


General: Well-developed and well-nourished 19-year-old male. Alert and 

oriented. Nontoxic appearing and in no acute distress.


HEENT: Atraumatic, normocephalic, pupils equal and reactive bilaterally, 

negative for conjunctival pallor or scleral icterus, mucous membranes moist, 

jaw is fixed in an open position, throat clear, neck supple, nontender, trachea 

midline. No drooling or trismus noted. No meningeal signs


Lungs: Clear to auscultation, breath sounds equal bilaterally, chest nontender.


Heart: S1S2, regular rate and rhythm without overt murmur


Abdomen: Soft, nondistended, nontender. Negative for masses or 

hepatosplenomegaly. Negative for costovertebral tenderness.


Pelvis: Stable nontender.


Genitourinary: Deferred.


Rectal: Deferred.


Skin: Intact, warm, dry. No lesions or rashes noted.


Extremities: Atraumatic, negative for cords or calf pain. Neurovascular 

unremarkable.


Neuro: Awake, alert, oriented. Cranial nerves II through XII unremarkable. 

Cerebellum unremarkable. Motor and sensory unremarkable throughout. Exam 

nonfocal.





Notes:


Patient refuses to allow provider to perform a closed reduction without 

anesthesia. IV toradol given at this time. Anesthesia was paged. Xray shows 

evidence of fracture. 





2050: Dr BARTON here to help with anesthesia; orders per anesthesia ordered


2115: The procedure, all risks and benifits were explained to the patient. 

Patient is agreeable; a consent form was signed. Medications given per 

anesthesia. A closed reduction of the mandible was performed without 

difficulty. Vital signs remain stable. The jaw was secured loosely with a wrap 

for comfort at this time. Anesthesia remains at the bedside to monitor vitals, 

airway and postanesthesia medications.





Patient is alert and oriented. Vital signs are stable. Patient is discharged to 

home with supportive care measures.





Diagnostics:


Mandible x-ray





Therapeutics:


Toradol IV, IV fluids


Dr BARTON ordered (Fentanyl, Propofoal, Versed) 





Prescription:


None





Impression: 


Jaw dislocation





Plan:


1. Tylenol and/or ibuprofen as needed for pain management.


2. Please follow up with the maxillofacial surgeon for definitive care. Return 

to the ED as needed and as discussed.





Definitive disposition and diagnosis as appropriate pending reevaluation and 

review of above.





Onset: Today


Duration: Hour(s):


Location: Reports: Head


  ** Lower Oral/Mouth


Pain Score (Numeric/FACES): 8





- Related Data


 Allergies











Allergy/AdvReac Type Severity Reaction Status Date / Time


 


ketamine Allergy  Confusion Verified 08/10/18 19:31


 


Penicillins Allergy  Hives Verified 08/10/18 19:31











Home Meds: 


 Home Meds





. [No Known Home Meds]  05/29/18 [History]











Past Medical History





- Past Health History


Medical/Surgical History: Denies Medical/Surgical History


HEENT History: Reports: Other (See Below)


Other HEENT History: Lock Jaw


Cardiovascular History: Reports: None


Respiratory History: Reports: None


Gastrointestinal History: Reports: Other (See Below)


Other Gastrointestinal History: Hx hernia


Genitourinary History: Reports: None


Musculoskeletal History: Reports: Other (See Below)


Other Musculoskeletal History: recurrent lock jaw


Neurological History: Reports: None


Psychiatric History: Reports: None


Endocrine/Metabolic History: Reports: None


Hematologic History: Reports: None


Immunologic History: Reports: None


Oncologic (Cancer) History: Reports: None


Dermatologic History: Reports: None





- Infectious Disease History


Infectious Disease History: Reports: Chicken Pox





- Past Surgical History


Head Surgeries/Procedures: Reports: None


HEENT Surgical History: Reports: None


Cardiovascular Surgical History: Reports: None


Respiratory Surgical History: Reports: None


GI Surgical History: Reports: None


Male  Surgical History: Reports: None


Endocrine Surgical History: Reports: None


Neurological Surgical History: Reports: None


Musculoskeletal Surgical History: Reports: None


Oncologic Surgical History: Reports: None


Dermatological Surgical History: Reports: None





Social & Family History





- Family History


Family Medical History: Noncontributory





- Caffeine Use


Caffeine Use: Reports: None





ED ROS GENERAL





- Review of Systems


Review Of Systems: ROS reveals no pertinent complaints other than HPI.





ED EXAM, GENERAL





- Physical Exam


Exam: See Below (See dictation)





ED GENERAL MEDICAL PROCEDURES





- Additional/Other Procedure(s)


Other (Free Text) Procedure(s): 


Closed reduction of the jaw





Course





- Vital Signs


Last Recorded V/S: 


 Last Vital Signs











Temp  98.3 F   08/10/18 19:28


 


Pulse  92   08/10/18 19:28


 


Resp  18   08/10/18 19:28


 


BP  127/84   08/10/18 19:28


 


Pulse Ox  97   08/10/18 19:28














- Orders/Labs/Meds


Orders: 


 Active Orders 24 hr











 Category Date Time Status


 


 Mandible Comp Min 4V [CR] Stat Exams  08/10/18 19:36 Taken


 


 Propofol [Diprivan 100 ML] 100 ml Med  08/10/18 21:15 Active





 IV TITRATE   


 


 Desired Level of Sedation (RASS) [AST] Click To Edit Oth  08/10/18 21:04 

Ordered








 Medication Orders





Propofol (Diprivan 100 Ml)  100 mls @ 2.585 mls/hr IV TITRATE WALESKA; Protocol








Meds: 


Medications











Generic Name Dose Route Start Last Admin





  Trade Name Freq  PRN Reason Stop Dose Admin


 


Propofol  100 mls @ 2.585 mls/hr  08/10/18 21:15  





  Diprivan 100 Ml  IV   





  TITRATE WALESKA   





     





     





  Protocol   





  5 MCG/KG/MIN   














Discontinued Medications














Generic Name Dose Route Start Last Admin





  Trade Name Freq  PRN Reason Stop Dose Admin


 


Bacitracin  Confirm  08/10/18 21:03  08/10/18 21:12





  Bacitracin Oint 1 Gm  Administered  08/10/18 21:04  Not Given





  Dose   





  1 dose   





  .ROUTE   





  .STK-MED ONE   





     





     





     





     


 


Fentanyl  50 mcg  08/10/18 21:05  08/10/18 21:12





  Sublimaze  IVPUSH  08/10/18 21:06  Not Given





  ONETIME ONE   





     





     





     





     


 


Fentanyl  Confirm  08/10/18 21:08  





  Sublimaze  Administered  08/10/18 21:09  





  Dose   





  100 mcg   





  .ROUTE   





  .STK-MED ONE   





     





     





     





     


 


Fentanyl  100 mcg  08/10/18 21:12  





  Sublimaze  IVPUSH  08/10/18 21:13  





  ONETIME ONE   





     





     





     





     


 


Lidocaine HCl  Confirm  08/10/18 21:09  





  Xylocaine-Mpf 1%  Administered  08/10/18 21:10  





  Dose   





  5 mls @ as directed   





  .ROUTE   





  .STK-MED ONE   





     





     





     





     


 


Ketorolac Tromethamine  30 mg  08/10/18 19:51  08/10/18 20:03





  Toradol  IVPUSH  08/10/18 19:52  30 mg





  ONETIME ONE   Administration





     





     





     





     


 


Lidocaine HCl  20 ml  08/10/18 21:03  08/10/18 21:11





  Xylocaine 1%  INJECT  08/10/18 21:04  Not Given





  ONETIME ONE   





     





     





     





     


 


Lidocaine HCl  5 ml  08/10/18 21:12  





  Xylocaine-Mpf 1%  INJECT  08/10/18 21:13  





  ONETIME ONE   





     





     





     





     


 


Midazolam HCl  5 mg  08/10/18 21:03  08/10/18 21:11





  Versed 5 Mg/Ml  IVPUSH  08/10/18 21:04  Not Given





  ONETIME ONE   





     





     





     





     


 


Midazolam HCl  Confirm  08/10/18 21:08  





  Versed 1 Mg/Ml  Administered  08/10/18 21:09  





  Dose   





  2 mg   





  .ROUTE   





  .STK-MED ONE   





     





     





     





     


 


Midazolam HCl  2 mg  08/10/18 21:12  





  Versed 1 Mg/Ml  IVPUSH  08/10/18 21:13  





  ONETIME ONE   





     





     





     





     


 


Propofol  Confirm  08/10/18 21:09  08/10/18 21:15





  Diprivan  20 Ml  Administered  08/10/18 21:10  Not Given





  Dose   





  400 mg   





  .ROUTE   





  .STK-MED ONE   





     





     





     





     


 


Propofol  400 mg  08/10/18 21:12  





  Diprivan  20 Ml  IVPUSH  08/10/18 21:13  





  ONETIME ONE   





     





     





     





     














Departure





- Departure


Time of Disposition: 21:24


Disposition: Home, Self-Care 01


Condition: Good


Clinical Impression: 


Jaw dislocation


Qualifiers:


 Encounter type: initial encounter Qualified Code(s): S03.00XA - Dislocation of 

jaw, unspecified side, initial encounter








- Discharge Information


Instructions:  Jaw Dislocation, Easy-to-Read


Referrals: 


PCP,None [Primary Care Provider] - 


Forms:  ED Department Discharge


Additional Instructions: 


The following information is given to patients seen in the emergency department 

who are being discharged to home. This information is to outline your options 

for follow-up care. We provide all patients seen in our emergency department 

with a follow-up referral.





The need for follow-up, as well as the timing and circumstances, are variable 

depending upon the specifics of your emergency department visit.





If you don't have a primary care physician on staff, we will provide you with a 

referral. We always advise you to contact your personal physician following an 

emergency department visit to inform them of the circumstance of the visit and 

for follow-up with them and/or the need for any referrals to a consulting 

specialist.





The emergency department will also refer you to a specialist when appropriate. 

This referral assures that you have the opportunity for follow-up care with a 

specialist. All of these measure are taken in an effort to provide you with 

optimal care, which includes your follow-up.





Under all circumstances we always encourage you to contact your private 

physician who remains a resource for coordinating your care. When calling for 

follow-up care, please make the office aware that this follow-up is from your 

recent emergency room visit. If for any reason you are refused follow-up, 

please contact the [facility] Emergency 

Department at [phone] and asked to speak to the emergency department 

charge nurse.





[facility]


Primary Care


[address] [place] [address]


Phone: [phone]


Fax: [phone]





1. Tylenol and/or ibuprofen as needed for pain management.


2. Please follow up with the maxillofacial surgeon for definitive care. Return 

to the ED as needed and as discussed.





- My Orders


Last 24 Hours: 


My Active Orders





08/10/18 19:36


Mandible Comp Min 4V [CR] Stat 





08/10/18 21:04


Desired Level of Sedation (RASS) [AST] Click To Edit 





08/10/18 21:15


Propofol [Diprivan 100 ML] 100 ml IV TITRATE 














- Assessment/Plan


Last 24 Hours: 


My Active Orders





08/10/18 19:36


Mandible Comp Min 4V [CR] Stat 





08/10/18 21:04


Desired Level of Sedation (RASS) [AST] Click To Edit 





08/10/18 21:15


Propofol [Diprivan 100 ML] 100 ml IV TITRATE

## 2018-08-13 NOTE — CR
EXAM DATE: 08/10/18



PATIENT'S AGE: 27





Patient: VIOLETA MORRISON



Facility: [place], [place]

Patient ID: [id number]

Site Patient ID: [id number].

Site Accession #: [id number]

: 1990

Study: XRay Facial Mandible [id number]/10/2018 9:06:48 PM

Ordering Physician: Doctor Temp



Final Report: 

HISTORY:

Jaw locking.



TECHNIQUE:

Four views of the mandible.



COMPARISON:

2018.



FINDINGS:

The jaw is in the open mouth position. There is no mandibular fracture or bony 
destructive change. Dental filling.



IMPRESSION:

1. Jaw is in the open mouth position.

2. No mandibular fracture or bony destructive change involving the mandible.



Dictated by Janes Whitley MD @ 8/10/2018 9:12:11 PM







Dictated by: Janes Whitley MD @ 08/10/2018 21:12:16

(Electronic Signature)





Report Signed by Proxy.
JAMILA

## 2018-09-12 ENCOUNTER — HOSPITAL ENCOUNTER (EMERGENCY)
Dept: HOSPITAL 56 - MW.ED | Age: 28
Discharge: HOME | End: 2018-09-12
Payer: COMMERCIAL
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
## 2018-09-12 DIAGNOSIS — S03.02XA: Primary | ICD-10-CM

## 2018-09-12 DIAGNOSIS — Z88.8: ICD-10-CM

## 2018-09-12 DIAGNOSIS — Z88.0: ICD-10-CM

## 2018-09-12 DIAGNOSIS — F17.210: ICD-10-CM

## 2018-09-12 PROCEDURE — 96374 THER/PROPH/DIAG INJ IV PUSH: CPT

## 2018-09-12 PROCEDURE — 70100 X-RAY EXAM OF JAW <4VIEWS: CPT

## 2018-09-12 PROCEDURE — 99283 EMERGENCY DEPT VISIT LOW MDM: CPT

## 2018-09-12 PROCEDURE — 99153 MOD SED SAME PHYS/QHP EA: CPT

## 2018-09-12 PROCEDURE — 21480 CLTX TMPRMAND DISLC 1ST/SBSQ: CPT

## 2018-09-12 PROCEDURE — 96375 TX/PRO/DX INJ NEW DRUG ADDON: CPT

## 2018-09-12 PROCEDURE — 96361 HYDRATE IV INFUSION ADD-ON: CPT

## 2018-09-12 PROCEDURE — 99152 MOD SED SAME PHYS/QHP 5/>YRS: CPT

## 2018-09-12 NOTE — CR
EXAM DATE: 18



PATIENT'S AGE: 27





Patient: VIOLETA MORRISON



Facility: [place], [place]

Patient ID: [id number]

Site Patient ID: [id number]

Site Accession #: [id number]

: 1990

Study: XRay Facial Mandible [id number]/12/2018 3:21:03 AM

Ordering Physician: Archana Delvalle



Final Report: 

HISTORY:

Postreduction. 



COMPARISON:

From earlier today. 



FINDINGS:

The mandible was examined with AP and left lateral oblique views for a total of 
2 views. 



The left mandibular condyle is now in satisfactory position in the left 
condylar fossa. 



There is no sign of fracture, destructive lesion, or other osseous abnormality. 



The orbits, facial structures, and paranasal sinuses that are seen are normal 
in appearance. 





IMPRESSION:

SATISFACTORY APPEARANCE OF THE LEFT TMJ FOLLOWING REDUCTION. THE LEFT 
MANDIBULAR CONDYLE IS NOW IN THE MANDIBULAR FOSSA.





Dictated by Severino Reed MD @ Sep 12 2018 3:23AM

(Electronic Signature)







Report Signed by Proxy.
JAMILA

## 2018-09-12 NOTE — CR
EXAM DATE: 18



PATIENT'S AGE: 27





Patient: VIOLETA MORRISON



Facility: [place], [place]

Patient ID: [id number]

Site Patient ID: [id number].

Site Accession #: [id number].

: 1990

Study: XRay Facial Mandible [id number]/12/2018 1:55:20 AM

Ordering Physician: Adelia Magaña



Final Report: 

HISTORY:

Mandibular dislocation. 



COMPARISON:

None available. 



FINDINGS:

The mandible was examined with AP, AP Syed and bilateral lateral oblique 
views for a total of four views. 



There appears to be dislocation of the left TMJ, with anterior displacement of 
the left mandibular condyle, best seen on the right oblique view. 



There is no sign of fracture, destructive lesion, or additional osseous 
abnormality. 



The dental structures are free of any destructive lesions. 



The orbits, facial structures, and paranasal sinuses that are seen are normal 
in appearance. 



IMPRESSION:

PROBABLE ANTERIOR DISLOCATION OF THE LEFT TMJ. 



IF FURTHER EXAMINATION IS REQUIRED, AN ORTHOPANTOMOGRAM OR CT SCANNING WOULD BE 
OF BENEFIT.





Dictated by Severino Reed MD @ Sep 12 2018 1:57AM

(Electronic Signature)







Report Signed by Proxy.
JAMILA

## 2018-09-12 NOTE — EDM.PDOC
ED HPI GENERAL MEDICAL PROBLEM





- General


Chief Complaint: General


Stated Complaint: LOCK JAW


Time Seen by Provider: 09/12/18 01:17





- History of Present Illness


INITIAL COMMENTS - FREE TEXT/NARRATIVE: 





HISTORY AND PHYSICAL:





History of present illness:


Patient is a 27-year-old male who is well known to this provider in the ED as 

this is his fourth visit to the ED for a jaw dislocation and  the 13th or 14th 

tendon this happened in his lifetime--- he says he has lost count. Patient has 

a typical presentation where he yawned area widely and the jaw dislocated and 

it is been out at approximately 8:30 PM. He says the last time he ate any food 

was at 2 PM. He says he has pain at his jaw area but has no other systemic 

complaints and feels slightly nauseated from the pain and is not taking 

anything for the pain. He was advised in the past that he needed to go [place] a 

[place] this [place] and he has yet to set that up. He 

has no other complaints and he denies any facial trauma.





Review of systems: 


As per history of present illness and below otherwise all systems reviewed and 

negative.





Past medical history: 


As per history of present illness and as reviewed below otherwise 

noncontributory.





Surgical history: 


As per history of present illness and as reviewed below otherwise 

noncontributory.





Social history: 


No reported history of drug or alcohol abuse.





Family history: 


As per history of present illness and as reviewed below otherwise 

noncontributory.





Physical exam:


General: Well-developed well-nourished man who is nontoxic and is unable to 

close his mouth. Vital signs were noted by me.


HEENT: Atraumatic, normocephalic, pupils reactive, negative for conjunctival 

pallor or scleral icterus, mucous membranes moist, throat clear, neck supple, 

nontender, trachea midline. There is tenderness with palpation of his mandible 

and  muscles but there is no soft tissue swelling. There is no 

crepitus in this region and the patient is very sensitive to palpation with 

pain somewhat out of proportion to the exam. There is no cervical adenopathy


Lungs: Clear to auscultation, breath sounds equal bilaterally, chest nontender.


Heart: S1S2, regular rate and rhythm no overt murmurs


Abdomen: Soft, nondistended, nontender. NABS


Pelvis: Deferred


Genitourinary: Deferred.


Rectal: Deferred.


Extremities: Atraumatic, full range of motion without defects or deficits 

Neurovascular unremarkable.


Neuro: Awake, alert, oriented. Cranial nerves II through XII unremarkable. 

Cerebellum unremarkable. Motor and sensory unremarkable throughout. Exam 

nonfocal.





Diagnostics:


Mandible x-ray x 2





Therapeutics:


IV fluids Zofran Toradol, conscious sedation per anesthesia





As I am familiar with this patient from a prior ER visit and have reviewed his 

other ER visits, he is very intolerant of trying any maneuvers to try to 

relocate his jaw without any narcotics and/or conscious sedation. He does not 

want us to attempt anything without that. I will proceed to give him some 

Toradol through the IV and do a quick x-ray and then call anesthesia for 

conscious sedation and reduction.





Procedure note: After x-rays reviewed and it was noted that only the left side 

of the jaw is dislocated the procedure was explained to the patient and consent 

was signed and anesthesia was contacted for conscious sedation. They will 

discuss risks and benefits of conscious sedation with the patient 

independently. After sedation was administered using up lift on the right 

mandible with downward pressure of the condyle the jaw was reduced but was very 

challenging to perform. Patient was wrapped using an Ace bandage and x-ray will 

be performed postreduction. 





There Were no complications and the patient tolerated the procedure well.  the 

patient was advised  to keep the Ace bandage in place for the next 24-36 hours. 

A postreduction x-ray has been ordered


Impression: 


Mandible dislocation recurrent





Definitive disposition and diagnosis as appropriate pending reevaluation and 

review of above.


  ** Face


Pain Score (Numeric/FACES): 9





- Related Data


 Allergies











Allergy/AdvReac Type Severity Reaction Status Date / Time


 


ketamine Allergy  Confusion Verified 08/10/18 19:31


 


Penicillins Allergy  Hives Verified 08/10/18 19:31











Home Meds: 


 Home Meds





. [No Known Home Meds]  05/29/18 [History]











Past Medical History





- Past Health History


Medical/Surgical History: Denies Medical/Surgical History


HEENT History: Reports: Other (See Below)


Other HEENT History: Lock Jaw


Cardiovascular History: Reports: None


Respiratory History: Reports: None


Gastrointestinal History: Reports: Other (See Below)


Other Gastrointestinal History: Hx hernia


Genitourinary History: Reports: None


Musculoskeletal History: Reports: Other (See Below)


Other Musculoskeletal History: recurrent lock jaw


Neurological History: Reports: None


Psychiatric History: Reports: None


Endocrine/Metabolic History: Reports: None


Hematologic History: Reports: None


Immunologic History: Reports: None


Oncologic (Cancer) History: Reports: None


Dermatologic History: Reports: None





- Infectious Disease History


Infectious Disease History: Reports: Chicken Pox





- Past Surgical History


Head Surgeries/Procedures: Reports: None


HEENT Surgical History: Reports: None


Cardiovascular Surgical History: Reports: None


Respiratory Surgical History: Reports: None


GI Surgical History: Reports: None


Male  Surgical History: Reports: None


Endocrine Surgical History: Reports: None


Neurological Surgical History: Reports: None


Musculoskeletal Surgical History: Reports: None


Oncologic Surgical History: Reports: None


Dermatological Surgical History: Reports: None





Social & Family History





- Family History


Family Medical History: Noncontributory





- Tobacco Use


Smoking Status *Q: Current Some Day Smoker


Years of Tobacco use: 5


Packs/Tins Daily: 0.1





- Caffeine Use


Caffeine Use: Reports: None





ED ROS GENERAL





- Review of Systems


Review Of Systems: ROS reveals no pertinent complaints other than HPI.





ED EXAM, GENERAL





- Physical Exam


Exam: See Below (See dictation)





Course





- Vital Signs


Last Recorded V/S: 


 Last Vital Signs











Temp  36.6 C   09/12/18 01:14


 


Pulse  69   09/12/18 01:14


 


Resp  16   09/12/18 01:14


 


BP  125/80   09/12/18 01:14


 


Pulse Ox  100   09/12/18 01:14














- Orders/Labs/Meds


Orders: 


 Active Orders 24 hr











 Category Date Time Status


 


 Mandible Less 4V [CR] Stat Exams  09/12/18 01:19 Taken


 


 Sodium Chloride 0.9% [Saline Flush] Med  09/12/18 01:19 Active





 10 ml FLUSH ASDIRECTED PRN   


 


 Sodium Chloride 0.9% [Saline Flush] Med  09/12/18 01:19 Active





 2.5 ml FLUSH ASDIRECTED PRN   


 


 Saline Lock Insert [OM.PC] Stat Oth  09/12/18 01:19 Ordered








 Medication Orders





Sodium Chloride (Saline Flush)  10 ml FLUSH ASDIRECTED PRN


   PRN Reason: Keep Vein Open


   Last Admin: 09/12/18 02:05  Dose: 10 ml


Sodium Chloride (Saline Flush)  2.5 ml FLUSH ASDIRECTED PRN


   PRN Reason: Keep Vein Open


   Last Admin: 09/12/18 02:03  Dose: 2.5 ml








Meds: 


Medications











Generic Name Dose Route Start Last Admin





  Trade Name John  PRN Reason Stop Dose Admin


 


Sodium Chloride  10 ml  09/12/18 01:19  09/12/18 02:05





  Saline Flush  FLUSH   10 ml





  ASDIRECTED PRN   Administration





  Keep Vein Open   





     





     





     


 


Sodium Chloride  2.5 ml  09/12/18 01:19  09/12/18 02:03





  Saline Flush  FLUSH   2.5 ml





  ASDIRECTED PRN   Administration





  Keep Vein Open   





     





     





     














Discontinued Medications














Generic Name Dose Route Start Last Admin





  Trade Name Freq  PRN Reason Stop Dose Admin


 


Fentanyl  Confirm  09/12/18 02:23  





  Sublimaze  Administered  09/12/18 02:24  





  Dose   





  100 mcg   





  .ROUTE   





  .STK-MED ONE   





     





     





     





     


 


Sodium Chloride  1,000 mls @ 999 mls/hr  09/12/18 01:19  09/12/18 01:53





  Normal Saline  IV  09/12/18 02:19  999 mls/hr





  STAT ONE   Administration





     





     





     





     


 


Ketorolac Tromethamine  30 mg  09/12/18 01:19  09/12/18 01:59





  Toradol  IVPUSH  09/12/18 01:20  30 mg





  ONETIME ONE   Administration





     





     





     





     


 


Midazolam HCl  Confirm  09/12/18 02:23  





  Versed 1 Mg/Ml  Administered  09/12/18 02:24  





  Dose   





  2 mg   





  .ROUTE   





  .STK-MED ONE   





     





     





     





     


 


Ondansetron HCl  4 mg  09/12/18 01:22  09/12/18 01:56





  Zofran  IVPUSH  09/12/18 01:23  4 mg





  ONETIME ONE   Administration





     





     





     





     


 


Propofol  Confirm  09/12/18 02:24  





  Diprivan  20 Ml  Administered  09/12/18 02:25  





  Dose   





  200 mg   





  .ROUTE   





  .STK-MED ONE   





     





     





     





     


 


Propofol  Confirm  09/12/18 02:34  





  Diprivan  20 Ml  Administered  09/12/18 02:35  





  Dose   





  200 mg   





  .ROUTE   





  .STK-MED ONE   





     





     





     





     














Departure





- Departure


Time of Disposition: 02:45


Disposition: Home, Self-Care 01


Condition: Good


Clinical Impression: 


Dislocation of mandible


Qualifiers:


 Encounter type: subsequent encounter Qualified Code(s): S03.00XD - Dislocation 

of jaw, unspecified side, subsequent encounter








- Discharge Information


Referrals: 


PCP,None [Primary Care Provider] - 


Forms:  ED Department Discharge


Additional Instructions: 


The following information is given to patients seen in the emergency department 

who are being discharged to home. This information is to outline your options 

for follow-up care. We provide all patients seen in our emergency department 

with a follow-up referral.





The need for follow-up, as well as the timing and circumstances, are variable 

depending upon the specifics of your emergency department visit.





If you don't have a primary care physician on staff, we will provide you with a 

referral. We always advise you to contact your personal physician following an 

emergency department visit to inform them of the circumstance of the visit and 

for follow-up with them and/or the need for any referrals to a consulting 

specialist.





The emergency department will also refer you to a specialist when appropriate. 

This referral assures that you have the opportunity for followup care with a 

specialist. All of these measure are taken in an effort to provide you with 

optimal care, which includes your followup.





Under all circumstances we always encourage you to contact your private 

physician who remains a resource for coordinating  your care. When calling for 

followup care, please make the office aware that this follow-up is from your 

recent emergency room visit. If for any reason you are refused follow-up, 

please contact the [facility] emergency 

department at [phone] and ask to speak to the emergency department 

charge nurse.





[facility] 


Primary care- [place] [address]


[phone]








Use ice to areas of face to reduce inflammation and muscle soreness. Please 

contact and follow up with your provider in the clinic or one of ours for 

referrals for specialty care for definitive treatment of this recurrent 

problem. Return to ER as needed and as discussed. Please eat a soft diet and 

avoid much talking for the next 12-24 hours. Please leave Ace bandage placed in 

the ED on for the next 24 hours.





- My Orders


Last 24 Hours: 


My Active Orders





09/12/18 01:19


Mandible Less 4V [CR] Stat 


Sodium Chloride 0.9% [Saline Flush]   10 ml FLUSH ASDIRECTED PRN 


Sodium Chloride 0.9% [Saline Flush]   2.5 ml FLUSH ASDIRECTED PRN 


Saline Lock Insert [OM.PC] Stat 














- Assessment/Plan


Last 24 Hours: 


My Active Orders





09/12/18 01:19


Mandible Less 4V [CR] Stat 


Sodium Chloride 0.9% [Saline Flush]   10 ml FLUSH ASDIRECTED PRN 


Sodium Chloride 0.9% [Saline Flush]   2.5 ml FLUSH ASDIRECTED PRN 


Saline Lock Insert [OM.PC] Stat